# Patient Record
Sex: FEMALE | Race: ASIAN
[De-identification: names, ages, dates, MRNs, and addresses within clinical notes are randomized per-mention and may not be internally consistent; named-entity substitution may affect disease eponyms.]

---

## 2018-06-26 ENCOUNTER — HOSPITAL ENCOUNTER (EMERGENCY)
Dept: HOSPITAL 25 - UCEAST | Age: 23
Discharge: HOME | End: 2018-06-26
Payer: COMMERCIAL

## 2018-06-26 VITALS — DIASTOLIC BLOOD PRESSURE: 74 MMHG | SYSTOLIC BLOOD PRESSURE: 103 MMHG

## 2018-06-26 DIAGNOSIS — J03.90: Primary | ICD-10-CM

## 2018-06-26 DIAGNOSIS — Z82.49: ICD-10-CM

## 2018-06-26 DIAGNOSIS — Z79.82: ICD-10-CM

## 2018-06-26 PROCEDURE — 99202 OFFICE O/P NEW SF 15 MIN: CPT

## 2018-06-26 PROCEDURE — 36415 COLL VENOUS BLD VENIPUNCTURE: CPT

## 2018-06-26 PROCEDURE — 86308 HETEROPHILE ANTIBODY SCREEN: CPT

## 2018-06-26 PROCEDURE — 87651 STREP A DNA AMP PROBE: CPT

## 2018-06-26 PROCEDURE — 85025 COMPLETE CBC W/AUTO DIFF WBC: CPT

## 2018-06-26 PROCEDURE — G0463 HOSPITAL OUTPT CLINIC VISIT: HCPCS

## 2018-06-26 NOTE — UC
Throat Pain/Nasal Rodolfo HPI





- HPI Summary


HPI Summary: 





Pt c/o sudden onset of sore throat, fever, chills and generalized malaise X 1 

day.  





- History of Current Complaint


Chief Complaint: UCRespiratory


Stated Complaint: SORE THROAT,HEAD AND BODY ACHES


Time Seen by Provider: 06/26/18 17:13


Hx Obtained From: Patient


Hx Last Menstrual Period: 2 weeks ago


Pregnant?: No


Onset/Duration: Sudden Onset, Lasting Days, Still Present


Severity: Moderate


Pain Intensity: 7


Cough: None


Associated Signs & Symptoms: Positive: Dysphagia, Fever





- Epiglottits Risk Factors


Epiglottis Risk Factors: Sudden Onset





- Allergies/Home Medications


Allergies/Adverse Reactions: 


 Allergies











Allergy/AdvReac Type Severity Reaction Status Date / Time


 


No Known Allergies Allergy   Verified 06/26/18 17:14











Home Medications: 


 Home Medications





Aspirin [Aspirin EC] 243 mg PO Q12HR PRN 06/26/18 [History Confirmed 06/26/18]











PMH/Surg Hx/FS Hx/Imm Hx


Previously Healthy: Yes





- Surgical History


Surgical History: None





- Family History


Known Family History: Positive: Cardiac Disease





- Social History


Occupation: Student


Lives: With Family


Alcohol Use: Occasionally


Substance Use Type: None


Smoking Status (MU): Never Smoked Tobacco


Have You Smoked in the Last Year: No





Review of Systems


Constitutional: Fever, Chills


Skin: Negative


Eyes: Negative


ENT: Sore Throat


Respiratory: Negative


Cardiovascular: Negative


Gastrointestinal: Negative


Genitourinary: Negative


Motor: Negative


Neurovascular: Negative


Musculoskeletal: Negative


Neurological: Negative


Psychological: Negative


Is Patient Immunocompromised?: No


All Other Systems Reviewed And Are Negative: Yes





Physical Exam


Triage Information Reviewed: Yes


Appearance: Ill-Appearing


Vital Signs: 


 Initial Vital Signs











Temp  101.3 F   06/26/18 17:13


 


Pulse  80   06/26/18 17:13


 


Resp  18   06/26/18 17:13


 


BP  103/74   06/26/18 17:13


 


Pulse Ox  98   06/26/18 17:13











Vital Signs Reviewed: Yes


Eye Exam: Normal


ENT Exam: Other


ENT: Positive: Pharyngeal erythema, Tonsillar swelling


Dental Exam: Normal


Neck exam: Other


Neck: Positive: Enlarged Nodes @ - right cervical


Respiratory Exam: Normal


Cardiovascular Exam: Normal


Musculoskeletal Exam: Normal


Neurological Exam: Normal


Psychological Exam: Normal


Skin Exam: Normal





Diagnostics





- Laboratory


Diagnostic Studies Completed/Ordered:  rapid strep: negative





Throat Pain/Nasal Course/Dx





- Differential Dx/Diagnosis


Differential Diagnosis/HQI/PQRI: Mononucleosis, Pharyngitis, Tonsillitis


Provider Diagnoses: tonsillitis





Discharge





- Sign-Out/Discharge


Documenting (check all that apply): Discharge/Admit/Transfer





- Discharge Plan


Condition: Stable


Disposition: HOME


Prescriptions: 


Penicillin  MG TAB(NF) [Penicillin  mg Tab] 500 mg PO Q8H #30 tab


Patient Education Materials:  Tonsillitis (ED)


Referrals: 


[facility] REFERRAL [Outside]


No Primary Care Phys,NOPCP [Primary Care Provider] - 





- Billing Disposition and Condition


Condition: STABLE


Disposition: Home

## 2018-06-27 LAB
BASOPHILS # BLD AUTO: 0 10^3/UL (ref 0–0.2)
EOSINOPHIL # BLD AUTO: 0.1 10^3/UL (ref 0–0.6)
HCT VFR BLD AUTO: 42 % (ref 35–47)
HGB BLD-MCNC: 14.7 G/DL (ref 12–16)
LYMPHOCYTES # BLD AUTO: 1.5 10^3/UL (ref 1–4.8)
MCH RBC QN AUTO: 32 PG (ref 27–31)
MCHC RBC AUTO-ENTMCNC: 35 G/DL (ref 31–36)
MCV RBC AUTO: 92 FL (ref 80–97)
MONOCYTES # BLD AUTO: 0.7 10^3/UL (ref 0–0.8)
NEUTROPHILS # BLD AUTO: 8.2 10^3/UL (ref 1.5–7.7)
NRBC # BLD AUTO: 0 10^3/UL
NRBC BLD QL AUTO: 0
PLATELET # BLD AUTO: 200 10^3/UL (ref 150–450)
RBC # BLD AUTO: 4.58 10^6/UL (ref 4–5.4)
WBC # BLD AUTO: 10.5 10^3/UL (ref 3.5–10.8)